# Patient Record
Sex: MALE | Race: WHITE | NOT HISPANIC OR LATINO | Employment: FULL TIME | URBAN - METROPOLITAN AREA
[De-identification: names, ages, dates, MRNs, and addresses within clinical notes are randomized per-mention and may not be internally consistent; named-entity substitution may affect disease eponyms.]

---

## 2017-01-23 ENCOUNTER — OFFICE VISIT (OUTPATIENT)
Dept: FAMILY MEDICINE CLINIC | Facility: CLINIC | Age: 42
End: 2017-01-23

## 2017-01-23 VITALS
RESPIRATION RATE: 18 BRPM | WEIGHT: 304.4 LBS | HEIGHT: 68 IN | HEART RATE: 86 BPM | BODY MASS INDEX: 46.13 KG/M2 | OXYGEN SATURATION: 97 % | TEMPERATURE: 97.6 F | SYSTOLIC BLOOD PRESSURE: 120 MMHG | DIASTOLIC BLOOD PRESSURE: 82 MMHG

## 2017-01-23 DIAGNOSIS — J33.9 NASAL POLYPS: ICD-10-CM

## 2017-01-23 DIAGNOSIS — H66.92 OTITIS, LEFT: ICD-10-CM

## 2017-01-23 DIAGNOSIS — J32.9 SINUSITIS, UNSPECIFIED CHRONICITY, UNSPECIFIED LOCATION: Primary | ICD-10-CM

## 2017-01-23 PROCEDURE — 99213 OFFICE O/P EST LOW 20 MIN: CPT | Performed by: FAMILY MEDICINE

## 2017-01-23 RX ORDER — MONTELUKAST SODIUM 10 MG/1
TABLET ORAL
Refills: 11 | COMMUNITY
Start: 2016-12-05 | End: 2018-02-06

## 2017-01-23 RX ORDER — PREDNISONE 10 MG/1
20 TABLET ORAL DAILY
Qty: 10 TABLET | Refills: 0 | Status: SHIPPED | OUTPATIENT
Start: 2017-01-23 | End: 2017-01-27 | Stop reason: SDUPTHER

## 2017-01-23 RX ORDER — AMOXICILLIN AND CLAVULANATE POTASSIUM 875; 125 MG/1; MG/1
1 TABLET, FILM COATED ORAL 2 TIMES DAILY
Qty: 20 TABLET | Refills: 0 | Status: SHIPPED | OUTPATIENT
Start: 2017-01-23 | End: 2018-02-06

## 2017-01-23 NOTE — MR AVS SNAPSHOT
Chano EARNESTINE Bernadette   1/23/2017 10:15 AM   Office Visit    Provider:  Kurt Nunes MD   Department:  [facility]   Dept Phone:  [phone]                Your Full Care Plan              Today's Medication Changes          These changes are accurate as of: 1/23/17 10:43 AM.  If you have any questions, ask your nurse or doctor.               New Medication(s)Ordered:     amoxicillin-clavulanate 875-125 MG per tablet   Commonly known as:  AUGMENTIN   Take 1 tablet by mouth 2 (Two) Times a Day.   Started by:  Kurt Nunes MD       Chlorcyclizine-Pseudoephed 25-60 MG tablet   Take 1 tablet by mouth Every 8 (Eight) Hours As Needed (congestion).   Started by:  Kurt Nunes MD       predniSONE 10 MG tablet   Commonly known as:  DELTASONE   Take 2 tablets by mouth Daily.   Replaces:  PredniSONE 10 MG (21) tablet pack   Started by:  Kurt Nunes MD         Stop taking medication(s)listed here:     ADVAIR DISKUS 250-50 MCG/DOSE DISKUS   Generic drug:  fluticasone-salmeterol   Stopped by:  Kurt Nunes MD           PredniSONE 10 MG (21) tablet pack   Commonly known as:  DELTASONE   Replaced by:  predniSONE 10 MG tablet   Stopped by:  Kurt Nunes MD                Where to Get Your Medications      These medications were sent to [facility]/pharmacy #[id number] - [place], [place] - [address] [phone] [facility] [phone]   [address] [address]    Hours:  24-hours Phone:  [phone]     amoxicillin-clavulanate 875-125 MG per tablet    Chlorcyclizine-Pseudoephed 25-60 MG tablet    predniSONE 10 MG tablet                  Your Updated Medication List          This list is accurate as of: 1/23/17 10:43 AM.  Always use your most recent med list.                amoxicillin-clavulanate 875-125 MG per tablet   Commonly known as:  AUGMENTIN   Take 1 tablet by mouth 2 (Two) Times a Day.       BREO ELLIPTA 200-25 MCG/INH aerosol  powder    Generic drug:  Fluticasone Furoate-Vilanterol       cetirizine-pseudoephedrine 5-120 MG per 12 hr tablet   Commonly known as:  ZyrTEC-D   Take 1 tablet by mouth 2 (two) times a day.       Chlorcyclizine-Pseudoephed 25-60 MG tablet   Take 1 tablet by mouth Every 8 (Eight) Hours As Needed (congestion).       cyclobenzaprine 10 MG tablet   Commonly known as:  FLEXERIL   Take 1 tablet by mouth 3 (three) times a day as needed for muscle spasms.       montelukast 10 MG tablet   Commonly known as:  SINGULAIR       predniSONE 10 MG tablet   Commonly known as:  DELTASONE   Take 2 tablets by mouth Daily.       * PROAIR  (90 BASE) MCG/ACT inhaler   Generic drug:  albuterol       * PROAIR  (90 BASE) MCG/ACT inhaler   Generic drug:  albuterol   INHALE 1 TO 2 PUFFS EVERY 6 HOURS AS NEEDED.       sulfamethoxazole-trimethoprim 800-160 MG per tablet   Commonly known as:  BACTRIM DS   Take 1 tablet by mouth 2 (two) times a day.       * Notice:  This list has 2 medication(s) that are the same as other medications prescribed for you. Read the directions carefully, and ask your doctor or other care provider to review them with you.            You Were Diagnosed With        Codes Comments    Sinusitis, unspecified chronicity, unspecified location    -  Primary ICD-10-CM: J32.9  ICD-9-CM: 473.9     Otitis, left     ICD-10-CM: H66.92  ICD-9-CM: 382.9       Instructions     None    Patient Instructions History      [employer] Signup     Our records indicate that you have an active [employer] account.    You can view your After Visit Summary by going to [employer] and logging in with your [employer] username and password.  If you don't have a [employer] username and password but a parent or guardian has access to your record, the parent or guardian should login with their own [employer] username and password and access your record to view the After Visit Summary.    If you have questions, you can email  [email or web address] or call [phone] to talk to our MyChart staff.  Remember, MyChart is NOT to be used for urgent needs.  For medical emergencies, dial 911.               Other Info from Your Visit           Allergies     No Known Allergies      Reason for Visit     Ear Fullness Lt x 4 days    Nasal Congestion x 7 days      Vital Signs     Blood Pressure Pulse Temperature Respirations Height Weight    120/82 86 97.6 °F (36.4 °C) 18 68\" (172.7 cm) 304 lb 6.4 oz (138 kg)    Oxygen Saturation Body Mass Index Smoking Status             97% 46.28 kg/m2 Never Smoker         Problems and Diagnoses Noted     Sinus infection    -  Primary    Disorder of ear          "

## 2017-01-23 NOTE — PROGRESS NOTES
"Subjective   Chano Fleming is a 41 y.o. male.     Ear Fullness    There is pain in the left ear. This is a new problem. The current episode started in the past 7 days. The problem occurs constantly. The problem has been unchanged. The pain is mild. Associated symptoms include coughing, headaches, hearing loss, rhinorrhea and a sore throat. Pertinent negatives include no ear discharge. The treatment provided mild relief.   Sinusitis   This is a new problem. The current episode started in the past 7 days. The problem is unchanged. There has been no fever. The pain is mild. Associated symptoms include congestion, coughing, ear pain, headaches, sinus pressure and a sore throat. Pertinent negatives include no chills or shortness of breath. (Severe nasal congestion history of nasal polyps) Past treatments include oral decongestants, saline sprays and spray decongestants. The treatment provided mild relief.        The following portions of the patient's history were reviewed and updated as appropriate: allergies, current medications, past social history and problem list.    Review of Systems   Constitutional: Negative for chills, fatigue and fever.   HENT: Positive for congestion, ear pain, hearing loss, postnasal drip, rhinorrhea, sinus pressure and sore throat. Negative for ear discharge.    Eyes: Positive for pain.   Respiratory: Positive for cough. Negative for shortness of breath.    Cardiovascular: Negative for chest pain and palpitations.   Neurological: Positive for headaches. Negative for dizziness.   Hematological: Negative for adenopathy.       Objective   Visit Vitals   • /82   • Pulse 86   • Temp 97.6 °F (36.4 °C)   • Resp 18   • Ht 68\" (172.7 cm)   • Wt (!) 304 lb 6.4 oz (138 kg)   • SpO2 97%   • BMI 46.28 kg/m2     Physical Exam   Constitutional: He is oriented to person, place, and time. He appears well-developed and well-nourished. He is cooperative.   HENT:   Head: Normocephalic.   Right Ear: " External ear normal.   Left Ear: External ear normal.   Mouth/Throat: No oropharyngeal exudate.   Significant nasal congestion the left tympanic membrane is dull there is a cloudy yellowish postnasal drip.   Eyes: Conjunctivae are normal. Pupils are equal, round, and reactive to light. No scleral icterus.   Neck: Neck supple. Carotid bruit is not present. No thyromegaly present.   Cardiovascular: Normal rate and regular rhythm.    Pulmonary/Chest: Effort normal. He has no wheezes.   Scattered upper airway rhonchi and no rales   Abdominal: There is no hepatosplenomegaly.   Musculoskeletal: Normal range of motion.   Lymphadenopathy:     He has no cervical adenopathy.   Neurological: He is alert and oriented to person, place, and time.   No focal deficits no lateralizing signs   Skin: Skin is warm and dry. No rash noted.   Psychiatric: He has a normal mood and affect. Cognition and memory are normal.   Nursing note and vitals reviewed.      Assessment/Plan   Problem List Items Addressed This Visit     None      Visit Diagnoses     Sinusitis, unspecified chronicity, unspecified location    -  Primary    Otitis, left        Nasal polyps              New Medications Ordered This Visit   Medications   • BREO ELLIPTA 200-25 MCG/INH aerosol powder      Sig: INHALE 1 PUFF BY MOUTH DAILY, RINSE MOUTH AFTER EACH USE     Refill:  11   • montelukast (SINGULAIR) 10 MG tablet     Sig: TAKE ONE TABLET AT BEDTIME     Refill:  11   • amoxicillin-clavulanate (AUGMENTIN) 875-125 MG per tablet     Sig: Take 1 tablet by mouth 2 (Two) Times a Day.     Dispense:  20 tablet     Refill:  0   • Chlorcyclizine-Pseudoephed 25-60 MG tablet     Sig: Take 1 tablet by mouth Every 8 (Eight) Hours As Needed (congestion).     Dispense:  42 tablet     Refill:  0   • predniSONE (DELTASONE) 10 MG tablet     Sig: Take 2 tablets by mouth Daily.     Dispense:  10 tablet     Refill:  0     May need to consider ENT evaluation regarding nasal polyps. Turn to  clinic if symptoms persist or do not improve.

## 2017-01-27 RX ORDER — PREDNISONE 10 MG/1
20 TABLET ORAL DAILY
Qty: 6 TABLET | Refills: 0 | Status: SHIPPED | OUTPATIENT
Start: 2017-01-27 | End: 2018-02-06

## 2017-02-01 DIAGNOSIS — J30.9 ATOPIC RHINITIS: Primary | ICD-10-CM

## 2017-02-01 DIAGNOSIS — Z91.09 ALLERGY TO POLLEN: ICD-10-CM

## 2017-04-21 ENCOUNTER — LAB (OUTPATIENT)
Dept: LAB | Facility: HOSPITAL | Age: 42
End: 2017-04-21
Attending: OTOLARYNGOLOGY

## 2017-04-21 ENCOUNTER — TRANSCRIBE ORDERS (OUTPATIENT)
Dept: LAB | Facility: HOSPITAL | Age: 42
End: 2017-04-21

## 2017-04-21 DIAGNOSIS — Z01.818 PRE-OP EXAM: Primary | ICD-10-CM

## 2017-04-21 DIAGNOSIS — Z01.818 PRE-OP EXAM: ICD-10-CM

## 2017-04-21 LAB
ANION GAP SERPL CALCULATED.3IONS-SCNC: 7 MMOL/L (ref 3–11)
BUN BLD-MCNC: 14 MG/DL (ref 9–23)
BUN/CREAT SERPL: 17.5 (ref 7–25)
CALCIUM SPEC-SCNC: 9.7 MG/DL (ref 8.7–10.4)
CHLORIDE SERPL-SCNC: 100 MMOL/L (ref 99–109)
CO2 SERPL-SCNC: 30 MMOL/L (ref 20–31)
CREAT BLD-MCNC: 0.8 MG/DL (ref 0.6–1.3)
GFR SERPL CREATININE-BSD FRML MDRD: 106 ML/MIN/1.73
GLUCOSE BLD-MCNC: 104 MG/DL (ref 70–100)
HCT VFR BLD AUTO: 48.4 % (ref 38.9–50.9)
HGB BLD-MCNC: 15.9 G/DL (ref 13.1–17.5)
POTASSIUM BLD-SCNC: 4.1 MMOL/L (ref 3.5–5.5)
SODIUM BLD-SCNC: 137 MMOL/L (ref 132–146)

## 2017-04-21 PROCEDURE — 36415 COLL VENOUS BLD VENIPUNCTURE: CPT

## 2017-04-21 PROCEDURE — 85018 HEMOGLOBIN: CPT

## 2017-04-21 PROCEDURE — 80048 BASIC METABOLIC PNL TOTAL CA: CPT

## 2017-04-21 PROCEDURE — 85014 HEMATOCRIT: CPT

## 2018-02-06 ENCOUNTER — OFFICE VISIT (OUTPATIENT)
Dept: FAMILY MEDICINE CLINIC | Facility: CLINIC | Age: 43
End: 2018-02-06

## 2018-02-06 VITALS
HEIGHT: 68 IN | RESPIRATION RATE: 16 BRPM | HEART RATE: 83 BPM | SYSTOLIC BLOOD PRESSURE: 144 MMHG | WEIGHT: 306 LBS | DIASTOLIC BLOOD PRESSURE: 88 MMHG | BODY MASS INDEX: 46.38 KG/M2 | OXYGEN SATURATION: 96 %

## 2018-02-06 DIAGNOSIS — J40 BRONCHITIS: Primary | ICD-10-CM

## 2018-02-06 DIAGNOSIS — J45.30 MILD PERSISTENT ASTHMA WITHOUT COMPLICATION: ICD-10-CM

## 2018-02-06 PROCEDURE — 99213 OFFICE O/P EST LOW 20 MIN: CPT | Performed by: FAMILY MEDICINE

## 2018-02-06 RX ORDER — DOXYCYCLINE HYCLATE 100 MG/1
100 CAPSULE ORAL 2 TIMES DAILY
Qty: 20 CAPSULE | Refills: 0 | Status: SHIPPED | OUTPATIENT
Start: 2018-02-06 | End: 2023-02-23

## 2018-02-06 NOTE — PROGRESS NOTES
"Subjective   Chano Fleming is a 42 y.o. male.     Cough   This is a recurrent problem. The current episode started 1 to 4 weeks ago. The problem has been waxing and waning. The problem occurs every few hours. The cough is productive of sputum, productive of brown sputum and productive of blood-tinged sputum. Associated symptoms include wheezing. Pertinent negatives include no chest pain, chills, ear congestion, ear pain, fever, headaches, heartburn, hemoptysis, myalgias, nasal congestion, postnasal drip, rash, rhinorrhea, sore throat, shortness of breath, sweats or weight loss. The symptoms are aggravated by exercise and lying down. He has tried a beta-agonist inhaler for the symptoms. The treatment provided mild relief. His past medical history is significant for asthma and bronchitis.        The following portions of the patient's history were reviewed and updated as appropriate: allergies, current medications, past social history and problem list.    Review of Systems   Constitutional: Negative for chills, fever and weight loss.   HENT: Negative for ear pain, postnasal drip, rhinorrhea and sore throat.    Respiratory: Positive for cough and wheezing. Negative for hemoptysis and shortness of breath.    Cardiovascular: Negative for chest pain.   Gastrointestinal: Negative for heartburn.   Musculoskeletal: Negative for myalgias.   Skin: Negative for rash.   Neurological: Negative for headaches.       Objective   /88  Pulse 83  Resp 16  Ht 172.7 cm (68\")  Wt (!) 139 kg (306 lb)  SpO2 96%  BMI 46.53 kg/m2  Physical Exam   Constitutional: He is oriented to person, place, and time. He appears well-developed and well-nourished. He is cooperative.   HENT:   Head: Normocephalic.   Right Ear: External ear normal.   Left Ear: External ear normal.   Nose: Nose normal.   Mouth/Throat: Oropharynx is clear and moist.   Eyes: Conjunctivae are normal. Pupils are equal, round, and reactive to light. No scleral " icterus.   Neck: Neck supple. Carotid bruit is not present. No thyromegaly present.   Cardiovascular: Normal rate and regular rhythm.    Pulmonary/Chest: Effort normal.   Upper airway rhonchi and mild to moderate expiratory wheezes with cough   Abdominal: There is no hepatosplenomegaly.   Musculoskeletal: Normal range of motion.   Neurological: He is alert and oriented to person, place, and time.   No focal deficits no lateralizing signs   Skin: Skin is warm and dry. No rash noted.   Psychiatric: He has a normal mood and affect. Cognition and memory are normal.   Nursing note and vitals reviewed.      Assessment/Plan   Problem List Items Addressed This Visit     None      Visit Diagnoses     Bronchitis    -  Primary    Relevant Medications    fluticasone-salmeterol (ADVAIR DISKUS) 500-50 MCG/DOSE DISKUS    albuterol (PROAIR RESPICLICK) 108 (90 Base) MCG/ACT inhaler    Mild persistent asthma without complication        Relevant Medications    fluticasone-salmeterol (ADVAIR DISKUS) 500-50 MCG/DOSE DISKUS    albuterol (PROAIR RESPICLICK) 108 (90 Base) MCG/ACT inhaler          New Medications Ordered This Visit   Medications   • fluticasone-salmeterol (ADVAIR DISKUS) 500-50 MCG/DOSE DISKUS     Sig: Inhale 1 puff 2 (Two) Times a Day.     Dispense:  60 each     Refill:  0   • doxycycline (VIBRAMYCIN) 100 MG capsule     Sig: Take 1 capsule by mouth 2 (Two) Times a Day.     Dispense:  20 capsule     Refill:  0   • albuterol (PROAIR RESPICLICK) 108 (90 Base) MCG/ACT inhaler     Sig: Inhale 2 puffs Every 6 (Six) Hours As Needed for Wheezing.     Dispense:  1 inhaler     Refill:  5     Reinstitute Advair if patient gets better relief may need to consider switching permanently if on his formulary.  Continue Mucinex twice a day with a full glass of water return to clinic if symptoms persist or worsen.

## 2022-04-12 ENCOUNTER — HOSPITAL ENCOUNTER (EMERGENCY)
Facility: HOSPITAL | Age: 47
Discharge: HOME | End: 2022-04-13
Attending: STUDENT IN AN ORGANIZED HEALTH CARE EDUCATION/TRAINING PROGRAM | Admitting: STUDENT IN AN ORGANIZED HEALTH CARE EDUCATION/TRAINING PROGRAM

## 2022-04-12 ENCOUNTER — APPOINTMENT (OUTPATIENT)
Dept: CT IMAGING | Facility: HOSPITAL | Age: 47
End: 2022-04-12

## 2022-04-12 DIAGNOSIS — M51.36 DEGENERATIVE DISC DISEASE, LUMBAR: ICD-10-CM

## 2022-04-12 DIAGNOSIS — Z87.09 HISTORY OF ASTHMA: ICD-10-CM

## 2022-04-12 DIAGNOSIS — R03.0 ELEVATED BLOOD-PRESSURE READING WITHOUT DIAGNOSIS OF HYPERTENSION: ICD-10-CM

## 2022-04-12 DIAGNOSIS — M51.26 HERNIATED INTERVERTEBRAL DISC OF LUMBAR SPINE: ICD-10-CM

## 2022-04-12 DIAGNOSIS — M54.50 ACUTE MIDLINE LOW BACK PAIN WITHOUT SCIATICA: Primary | ICD-10-CM

## 2022-04-12 DIAGNOSIS — Z86.39 HISTORY OF OBESITY: ICD-10-CM

## 2022-04-12 LAB
ALBUMIN SERPL-MCNC: 4 G/DL (ref 3.5–5.2)
ALBUMIN/GLOB SERPL: 1.1 G/DL
ALP SERPL-CCNC: 95 U/L (ref 39–117)
ALT SERPL W P-5'-P-CCNC: 40 U/L (ref 1–41)
ANION GAP SERPL CALCULATED.3IONS-SCNC: 9 MMOL/L (ref 5–15)
AST SERPL-CCNC: 28 U/L (ref 1–40)
BASOPHILS # BLD AUTO: 0.03 10*3/MM3 (ref 0–0.2)
BASOPHILS NFR BLD AUTO: 0.4 % (ref 0–1.5)
BILIRUB SERPL-MCNC: 0.3 MG/DL (ref 0–1.2)
BUN SERPL-MCNC: 15 MG/DL (ref 6–20)
BUN/CREAT SERPL: 17.9 (ref 7–25)
CALCIUM SPEC-SCNC: 9 MG/DL (ref 8.6–10.5)
CHLORIDE SERPL-SCNC: 104 MMOL/L (ref 98–107)
CO2 SERPL-SCNC: 26 MMOL/L (ref 22–29)
CREAT SERPL-MCNC: 0.84 MG/DL (ref 0.76–1.27)
DEPRECATED RDW RBC AUTO: 42.2 FL (ref 37–54)
EGFRCR SERPLBLD CKD-EPI 2021: 107.6 ML/MIN/1.73
EOSINOPHIL # BLD AUTO: 0.28 10*3/MM3 (ref 0–0.4)
EOSINOPHIL NFR BLD AUTO: 3.3 % (ref 0.3–6.2)
ERYTHROCYTE [DISTWIDTH] IN BLOOD BY AUTOMATED COUNT: 13.8 % (ref 12.3–15.4)
GLOBULIN UR ELPH-MCNC: 3.5 GM/DL
GLUCOSE SERPL-MCNC: 165 MG/DL (ref 65–99)
HCT VFR BLD AUTO: 45.9 % (ref 37.5–51)
HGB BLD-MCNC: 15.5 G/DL (ref 13–17.7)
HOLD SPECIMEN: NORMAL
HOLD SPECIMEN: NORMAL
IMM GRANULOCYTES # BLD AUTO: 0.02 10*3/MM3 (ref 0–0.05)
IMM GRANULOCYTES NFR BLD AUTO: 0.2 % (ref 0–0.5)
LYMPHOCYTES # BLD AUTO: 3.05 10*3/MM3 (ref 0.7–3.1)
LYMPHOCYTES NFR BLD AUTO: 36.1 % (ref 19.6–45.3)
MCH RBC QN AUTO: 28.3 PG (ref 26.6–33)
MCHC RBC AUTO-ENTMCNC: 33.8 G/DL (ref 31.5–35.7)
MCV RBC AUTO: 83.8 FL (ref 79–97)
MONOCYTES # BLD AUTO: 0.71 10*3/MM3 (ref 0.1–0.9)
MONOCYTES NFR BLD AUTO: 8.4 % (ref 5–12)
NEUTROPHILS NFR BLD AUTO: 4.36 10*3/MM3 (ref 1.7–7)
NEUTROPHILS NFR BLD AUTO: 51.6 % (ref 42.7–76)
NRBC BLD AUTO-RTO: 0 /100 WBC (ref 0–0.2)
PLATELET # BLD AUTO: 284 10*3/MM3 (ref 140–450)
PMV BLD AUTO: 9.2 FL (ref 6–12)
POTASSIUM SERPL-SCNC: 4.1 MMOL/L (ref 3.5–5.2)
PROT SERPL-MCNC: 7.5 G/DL (ref 6–8.5)
RBC # BLD AUTO: 5.48 10*6/MM3 (ref 4.14–5.8)
SODIUM SERPL-SCNC: 139 MMOL/L (ref 136–145)
WBC NRBC COR # BLD: 8.45 10*3/MM3 (ref 3.4–10.8)
WHOLE BLOOD HOLD SPECIMEN: NORMAL
WHOLE BLOOD HOLD SPECIMEN: NORMAL

## 2022-04-12 PROCEDURE — 96372 THER/PROPH/DIAG INJ SC/IM: CPT

## 2022-04-12 PROCEDURE — 93005 ELECTROCARDIOGRAM TRACING: CPT | Performed by: STUDENT IN AN ORGANIZED HEALTH CARE EDUCATION/TRAINING PROGRAM

## 2022-04-12 PROCEDURE — 25010000002 DEXAMETHASONE PER 1 MG: Performed by: STUDENT IN AN ORGANIZED HEALTH CARE EDUCATION/TRAINING PROGRAM

## 2022-04-12 PROCEDURE — 72131 CT LUMBAR SPINE W/O DYE: CPT

## 2022-04-12 PROCEDURE — 99284 EMERGENCY DEPT VISIT MOD MDM: CPT

## 2022-04-12 PROCEDURE — 85025 COMPLETE CBC W/AUTO DIFF WBC: CPT | Performed by: STUDENT IN AN ORGANIZED HEALTH CARE EDUCATION/TRAINING PROGRAM

## 2022-04-12 PROCEDURE — 36415 COLL VENOUS BLD VENIPUNCTURE: CPT

## 2022-04-12 PROCEDURE — 80053 COMPREHEN METABOLIC PANEL: CPT | Performed by: STUDENT IN AN ORGANIZED HEALTH CARE EDUCATION/TRAINING PROGRAM

## 2022-04-12 RX ORDER — HYDROCODONE BITARTRATE AND ACETAMINOPHEN 5; 325 MG/1; MG/1
1 TABLET ORAL ONCE
Status: COMPLETED | OUTPATIENT
Start: 2022-04-12 | End: 2022-04-12

## 2022-04-12 RX ORDER — SODIUM CHLORIDE 0.9 % (FLUSH) 0.9 %
10 SYRINGE (ML) INJECTION AS NEEDED
Status: DISCONTINUED | OUTPATIENT
Start: 2022-04-12 | End: 2022-04-13 | Stop reason: HOSPADM

## 2022-04-12 RX ORDER — METAXALONE 800 MG/1
800 TABLET ORAL ONCE
Status: COMPLETED | OUTPATIENT
Start: 2022-04-12 | End: 2022-04-12

## 2022-04-12 RX ORDER — DEXAMETHASONE SODIUM PHOSPHATE 10 MG/ML
10 INJECTION INTRAMUSCULAR; INTRAVENOUS ONCE
Status: COMPLETED | OUTPATIENT
Start: 2022-04-12 | End: 2022-04-12

## 2022-04-12 RX ADMIN — METAXALONE 800 MG: 800 TABLET ORAL at 23:55

## 2022-04-12 RX ADMIN — DEXAMETHASONE SODIUM PHOSPHATE 10 MG: 10 INJECTION INTRAMUSCULAR; INTRAVENOUS at 23:55

## 2022-04-12 RX ADMIN — HYDROCODONE BITARTRATE AND ACETAMINOPHEN 1 TABLET: 5; 325 TABLET ORAL at 23:55

## 2022-04-13 VITALS
TEMPERATURE: 98.7 F | BODY MASS INDEX: 45.47 KG/M2 | HEIGHT: 68 IN | HEART RATE: 73 BPM | RESPIRATION RATE: 18 BRPM | DIASTOLIC BLOOD PRESSURE: 83 MMHG | WEIGHT: 300 LBS | SYSTOLIC BLOOD PRESSURE: 143 MMHG | OXYGEN SATURATION: 94 %

## 2022-04-13 LAB — HOLD SPECIMEN: NORMAL

## 2022-04-13 RX ORDER — METHOCARBAMOL 750 MG/1
750 TABLET, FILM COATED ORAL 3 TIMES DAILY
Qty: 21 TABLET | Refills: 0 | Status: SHIPPED | OUTPATIENT
Start: 2022-04-13

## 2022-04-13 RX ORDER — METHYLPREDNISOLONE 4 MG/1
TABLET ORAL
Qty: 21 TABLET | Refills: 0 | Status: SHIPPED | OUTPATIENT
Start: 2022-04-13 | End: 2023-02-23

## 2022-04-13 NOTE — DISCHARGE INSTRUCTIONS
ER evaluation reveals multiple levels of herniated disc along the lumbar spine as well as degenerative disc disease, or arthritic changes.  Recommend no twisting, bending, or lifting greater than 10 pounds.  Recommend patient to discontinue Toradol while taking Medrol Dosepak and discontinue Flexeril while new prescription for Robaxin.  Rx for Robaxin 750 mg by mouth 3 times daily as needed for muscle spasms.  Recommend follow-up with neurosurgery, Dr. Carvalho, for evaluation.  Recommend close PCP follow-up for recheck on elevated blood pressure.  Blood pressure may be elevated due to the acute pain.  Return to the ER if worsening symptoms.  EKG and CBC and chemistries were within normal limits.

## 2022-04-13 NOTE — ED PROVIDER NOTES
Subjective   This is a 47-year-old male that presents the ER with low back pain that started 2 hours prior to arrival.  Pain is located to the mid lower lumbar spine and he describes it as sharp and stabbing without radiation.  Patient says that approximately 10 days ago he started having right lower back pain that radiated to the right buttocks and posterior right lower extremity.  He denies any urinary or bowel changes or incontinence.  He denies any pain to the lower extremities this evening or numbness/tingling.  Pain is worsened with sitting and ambulation.  Patient says that approximately 10 days ago, he was seen and evaluated at [facility] and given Toradol injection as well as oral Toradol and Flexeril on discharge.  Pain was finally starting to improve.  He had just taken a walk this evening and was bending over to take off his shoes and felt a pop in his back and started having significant pain again.  Patient denies history of chronic low back pain or previous back surgery.  He does have past medical history significant for obesity with BMI of 45.  He also has past medical history of asthma and seasonal allergies.  Patient denies any other complaints at this time.      History provided by:  Patient  Back Pain  Location:  Lumbar spine  Quality:  Shooting and stabbing  Radiates to: initially 10 days ago, pain was radiating to right buttocks and posterior RLE.  No radiation to RLE tonight.  Duration:  2 hours  Timing:  Constant  Chronicity:  Recurrent  Context: not falling and not lifting heavy objects    Context comment:  Pt has been having right lower back pain x 10 days.  Pt denied any known injury or trauma.  Seen at [facility] and dx with sciatica.  Rx for Toradol and Flexeril with improvement.  This pm, went over to take his shoe off and felt a pop and increased low back pain  Relieved by:  Nothing  Worsened by:  Ambulation and sitting  Ineffective treatments: Oral Toradol this pm but no flexeril.  Associated  symptoms: no abdominal pain, no abdominal swelling, no bladder incontinence, no bowel incontinence, no dysuria, no leg pain, no numbness, no paresthesias, no pelvic pain, no perianal numbness and no tingling    Risk factors comment:  No previous chronic back pain and no previous back surgery.  Pt has a desk job.  No recent injury or trauma.      Review of Systems   Constitutional: Negative.    Gastrointestinal: Negative.  Negative for abdominal pain, bowel incontinence, diarrhea, nausea and vomiting.        No bowel incontinence.   Genitourinary: Negative.  Negative for bladder incontinence, dysuria, enuresis, frequency, pelvic pain and urgency.        No urinary incontinence.   Musculoskeletal: Positive for back pain (Mid lower lumbar spinal pain.) and gait problem.   Neurological: Negative for tingling, numbness and paresthesias.   All other systems reviewed and are negative.      Past Medical History:   Diagnosis Date   • Acute bronchitis    • Allergic rhinitis    • Asthma        No Known Allergies    Past Surgical History:   Procedure Laterality Date   • APPENDECTOMY     • POLYPECTOMY      nasal       No family history on file.    Social History     Socioeconomic History   • Marital status:    Tobacco Use   • Smoking status: Never Smoker   • Smokeless tobacco: Never Used   Substance and Sexual Activity   • Alcohol use: No   • Drug use: No   • Sexual activity: Yes     Partners: Female     Birth control/protection: Other     Comment:            Objective   Physical Exam  Vitals and nursing note reviewed.   Constitutional:       Appearance: Normal appearance.      Comments: Obesity with BMI of 45   HENT:      Head: Normocephalic and atraumatic.      Right Ear: Tympanic membrane normal.      Left Ear: Tympanic membrane normal.      Nose: Nose normal.      Mouth/Throat:      Mouth: Mucous membranes are moist.      Pharynx: Oropharynx is clear.   Eyes:      Extraocular Movements: Extraocular movements  intact.      Conjunctiva/sclera: Conjunctivae normal.      Pupils: Pupils are equal, round, and reactive to light.   Cardiovascular:      Rate and Rhythm: Normal rate and regular rhythm.      Pulses: Normal pulses.      Heart sounds: Normal heart sounds.   Pulmonary:      Effort: Pulmonary effort is normal.      Breath sounds: Normal breath sounds.   Abdominal:      General: Bowel sounds are normal.      Palpations: Abdomen is soft.   Musculoskeletal:      Cervical back: Normal range of motion and neck supple.      Lumbar back: Tenderness present. No swelling or deformity. Decreased range of motion. Positive right straight leg raise test and positive left straight leg raise test.        Back:       Comments: Localized tenderness to mid lower lumbar spine.  No paraspinal muscular tenderness or muscle spasms.  No tenderness to the bilateral buttocks.  Positive straight leg raise bilaterally at 60 degrees.  2+ patellar reflexes bilaterally.  Normal dorsi/plantar flexion bilaterally.  Painful, limited range of motion.   Skin:     General: Skin is warm and dry.   Neurological:      General: No focal deficit present.      Mental Status: He is alert.      Cranial Nerves: Cranial nerves are intact.      Sensory: Sensation is intact.      Motor: Motor function is intact.      Coordination: Coordination is intact.      Deep Tendon Reflexes:      Reflex Scores:       Patellar reflexes are 2+ on the right side and 2+ on the left side.        Procedures           ED Course  ED Course as of 04/13/22 0050   Wed Apr 13, 2022   0044 EKG shows normal sinus rhythm.  Evidence of right bundle branch block.  No acute ST-T wave changes consistent with ischemia.  CBC and chemistries were within normal limits.  CT of the lumbar spine without contrast reveals no acute abnormality of the lumbar spine.  Mild degenerative changes.  Moderate neuroforaminal stenosis on the right at L5-S1.  There are mild disc bulges all along the lumbar spine.   Patient given Decadron injection, Skelaxin, and one-time dose of Norco.  We will prescribe Skelaxin and Medrol Dosepak on discharge.  Recommend close PCP follow-up for recheck and we also will give follow-up information for neurosurgery, Dr. Carvalho, for assessment.  Recommend no twisting, bending, or any lifting greater than 10 pounds.  Patient's blood pressure was also elevated during the ER course, most likely related to pain.  Recommend close PCP follow-up for recheck on this, as well. [FC]      ED Course User Index  [FC] Chinyere England PA-C      Recent Results (from the past 24 hour(s))   Comprehensive Metabolic Panel    Collection Time: 04/12/22 10:21 PM    Specimen: Blood   Result Value Ref Range    Glucose 165 (H) 65 - 99 mg/dL    BUN 15 6 - 20 mg/dL    Creatinine 0.84 0.76 - 1.27 mg/dL    Sodium 139 136 - 145 mmol/L    Potassium 4.1 3.5 - 5.2 mmol/L    Chloride 104 98 - 107 mmol/L    CO2 26.0 22.0 - 29.0 mmol/L    Calcium 9.0 8.6 - 10.5 mg/dL    Total Protein 7.5 6.0 - 8.5 g/dL    Albumin 4.00 3.50 - 5.20 g/dL    ALT (SGPT) 40 1 - 41 U/L    AST (SGOT) 28 1 - 40 U/L    Alkaline Phosphatase 95 39 - 117 U/L    Total Bilirubin 0.3 0.0 - 1.2 mg/dL    Globulin 3.5 gm/dL    A/G Ratio 1.1 g/dL    BUN/Creatinine Ratio 17.9 7.0 - 25.0    Anion Gap 9.0 5.0 - 15.0 mmol/L    eGFR 107.6 >60.0 mL/min/1.73   Green Top (Gel)    Collection Time: 04/12/22 10:21 PM   Result Value Ref Range    Extra Tube Hold for add-ons.    Lavender Top    Collection Time: 04/12/22 10:21 PM   Result Value Ref Range    Extra Tube hold for add-on    Gold Top - SST    Collection Time: 04/12/22 10:21 PM   Result Value Ref Range    Extra Tube Hold for add-ons.    Light Blue Top    Collection Time: 04/12/22 10:21 PM   Result Value Ref Range    Extra Tube hold for add-on    CBC Auto Differential    Collection Time: 04/12/22 10:21 PM    Specimen: Blood   Result Value Ref Range    WBC 8.45 3.40 - 10.80 10*3/mm3    RBC 5.48 4.14 - 5.80 10*6/mm3     Hemoglobin 15.5 13.0 - 17.7 g/dL    Hematocrit 45.9 37.5 - 51.0 %    MCV 83.8 79.0 - 97.0 fL    MCH 28.3 26.6 - 33.0 pg    MCHC 33.8 31.5 - 35.7 g/dL    RDW 13.8 12.3 - 15.4 %    RDW-SD 42.2 37.0 - 54.0 fl    MPV 9.2 6.0 - 12.0 fL    Platelets 284 140 - 450 10*3/mm3    Neutrophil % 51.6 42.7 - 76.0 %    Lymphocyte % 36.1 19.6 - 45.3 %    Monocyte % 8.4 5.0 - 12.0 %    Eosinophil % 3.3 0.3 - 6.2 %    Basophil % 0.4 0.0 - 1.5 %    Immature Grans % 0.2 0.0 - 0.5 %    Neutrophils, Absolute 4.36 1.70 - 7.00 10*3/mm3    Lymphocytes, Absolute 3.05 0.70 - 3.10 10*3/mm3    Monocytes, Absolute 0.71 0.10 - 0.90 10*3/mm3    Eosinophils, Absolute 0.28 0.00 - 0.40 10*3/mm3    Basophils, Absolute 0.03 0.00 - 0.20 10*3/mm3    Immature Grans, Absolute 0.02 0.00 - 0.05 10*3/mm3    nRBC 0.0 0.0 - 0.2 /100 WBC     Note: In addition to lab results from this visit, the labs listed above may include labs taken at another facility or during a different encounter within the last 24 hours. Please correlate lab times with ED admission and discharge times for further clarification of the services performed during this visit.    CT Lumbar Spine Without Contrast   Final Result       1. No acute abnormality of the lumbar spine.   2. Mild degenerative changes as discussed above. There is up to moderate neuroforaminal stenosis on the right at L5-S1. No high-grade spinal canal narrowing.             Electronically signed by:  Dinh Garcia     4/12/2022 10:20 PM Mountain Time        Vitals:    04/12/22 2200 04/12/22 2230 04/12/22 2300 04/12/22 2330   BP: (!) 200/124 (!) 187/107 151/96 (!) 168/105   BP Location:       Patient Position:       Pulse:  93 82 84   Resp:       Temp:       TempSrc:       SpO2: 96%      Weight:       Height:         Medications   sodium chloride 0.9 % flush 10 mL (has no administration in time range)   metaxalone (SKELAXIN) tablet 800 mg (800 mg Oral Given 4/12/22 9535)   dexamethasone (DECADRON) injection 10 mg (10 mg  Intramuscular Given 4/12/22 2355)   HYDROcodone-acetaminophen (NORCO) 5-325 MG per tablet 1 tablet (1 tablet Oral Given 4/12/22 2355)     ECG/EMG Results (last 24 hours)     Procedure Component Value Units Date/Time    ECG 12 Lead [[id number]] Collected: 04/12/22 2232     Updated: 04/12/22 2233        ECG 12 Lead   Preliminary Result                                                        MDM    Final diagnoses:   Acute midline low back pain without sciatica   Herniated intervertebral disc of lumbar spine   Elevated blood-pressure reading without diagnosis of hypertension   History of asthma   History of obesity   Degenerative disc disease, lumbar       ED Disposition  ED Disposition     ED Disposition   Discharge    Condition   Stable    Comment   --             Roni Carvalho MD  [address] [address]  [phone]    Call in 1 day  Call in the morning for first available recheck    [facility]  [address] [id number] [phone]    If symptoms worsen         Medication List      New Prescriptions    methocarbamol 750 MG tablet  Commonly known as: ROBAXIN  Take 1 tablet by mouth 3 (Three) Times a Day.     methylPREDNISolone 4 MG dose pack  Commonly known as: MEDROL  Take as directed on package instructions.           Where to Get Your Medications      These medications were sent to [facility]/pharmacy #[id number] - [place], [place] - [address] - [phone]  - [phone]   [address] [address]    Hours: 24-hours Phone: [phone]   · methocarbamol 750 MG tablet  · methylPREDNISolone 4 MG dose pack          Chinyere England PA-C  04/13/22 0050

## 2022-04-15 LAB
QT INTERVAL: 400 MS
QTC INTERVAL: 472 MS

## 2024-04-23 ENCOUNTER — HOSPITAL ENCOUNTER (OUTPATIENT)
Dept: GENERAL RADIOLOGY | Facility: HOSPITAL | Age: 49
Discharge: HOME | End: 2024-04-23
Payer: COMMERCIAL

## 2024-04-23 ENCOUNTER — TRANSCRIBE ORDERS (OUTPATIENT)
Dept: GENERAL RADIOLOGY | Facility: HOSPITAL | Age: 49
End: 2024-04-23
Payer: COMMERCIAL

## 2024-04-23 DIAGNOSIS — J20.8 VIRAL BRONCHITIS: ICD-10-CM

## 2024-04-23 DIAGNOSIS — J20.8 VIRAL BRONCHITIS: Primary | ICD-10-CM

## 2024-04-23 PROCEDURE — 71046 X-RAY EXAM CHEST 2 VIEWS: CPT
